# Patient Record
Sex: MALE | Race: WHITE
[De-identification: names, ages, dates, MRNs, and addresses within clinical notes are randomized per-mention and may not be internally consistent; named-entity substitution may affect disease eponyms.]

---
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
## 2021-06-12 NOTE — DISCHARGE NOTE PEDIATRIC - PROVIDER TOKENS
Lm adv adv per hao    FREE:[LAST:[Suppa],FIRST:[[place]],PHONE:[phone]],FAX:[(   )    -],ADDRESS:[[address] [place] [address]]],FREE:[LAST:[Enrrique],FIRST:[Brittany],PHONE:[phone]:[(   )    -],ADDRESS:[[address] [place] [address]]] FREE:[LAST:[Suppa],FIRST:[[place]],PHONE:[phone]],FAX:[(   )    -],ADDRESS:[[address] [place] [address], [place] [id number]  [place] [address]]],FREE:[LAST:[Enrrique],FIRST:[Brittany],PHONE:[phone]:[(   )    -],ADDRESS:[[address] [place] [address], [place] [address]]],FREE:[LAST:[Estrada],FIRST:[Herminia],PHONE:[phone]],FAX:[(   )    -],ADDRESS:[[address] [place] [address]]],TOKEN:'[id number]'

## 2021-11-12 ENCOUNTER — EMERGENCY (EMERGENCY)
Facility: HOSPITAL | Age: 21
LOS: 1 days | Discharge: HOME | End: 2021-11-12
Attending: EMERGENCY MEDICINE
Payer: COMMERCIAL

## 2021-11-12 VITALS
RESPIRATION RATE: 14 BRPM | WEIGHT: 171.08 LBS | DIASTOLIC BLOOD PRESSURE: 80 MMHG | HEART RATE: 130 BPM | TEMPERATURE: 98 F | SYSTOLIC BLOOD PRESSURE: 130 MMHG | OXYGEN SATURATION: 94 %

## 2021-11-12 NOTE — ED ADULT TRIAGE NOTE - CHIEF COMPLAINT QUOTE
Patient BIBEMS after being found unresponsive by friends. Patient's friends state they took Percocet and Xanax. Patient received 4mg Narcan IN prior to arrival and is now awake and alert.

## 2021-11-13 VITALS
TEMPERATURE: 98 F | SYSTOLIC BLOOD PRESSURE: 120 MMHG | RESPIRATION RATE: 16 BRPM | OXYGEN SATURATION: 95 % | DIASTOLIC BLOOD PRESSURE: 70 MMHG | HEART RATE: 99 BPM

## 2021-11-13 NOTE — ED PROVIDER NOTE - OBJECTIVE STATEMENT
21yom found unresponsive after taking percocet required naloxone reversal by EMS. Has no complaints at present. 21yom found unresponsive after taking percocet required naloxone reversal by EMS. Has no complaints at present. States overdose was accidental and declines any need for substance abuse treatment.

## 2021-11-13 NOTE — ED PROVIDER NOTE - CLINICAL SUMMARY MEDICAL DECISION MAKING FREE TEXT BOX
21yom found unresponsive after taking percocet required naloxone reversal by EMS. Has no complaints at present. States overdose was accidental and declines any need for substance abuse treatment. Provide naloxone rescue kit

## 2021-11-13 NOTE — ED PROVIDER NOTE - PATIENT PORTAL LINK FT
You can access the FollowMyHealth Patient Portal offered by [facility] by registering at the following website: [email or web address]. By joining [employer]’s FollowMyHealth portal, you will also be able to view your health information using other applications (apps) compatible with our system.

## 2021-11-19 ENCOUNTER — EMERGENCY (EMERGENCY)
Facility: HOSPITAL | Age: 21
LOS: 0 days | Discharge: HOME | End: 2021-11-19
Attending: EMERGENCY MEDICINE
Payer: COMMERCIAL

## 2021-11-19 VITALS
DIASTOLIC BLOOD PRESSURE: 90 MMHG | OXYGEN SATURATION: 100 % | TEMPERATURE: 99 F | HEART RATE: 62 BPM | SYSTOLIC BLOOD PRESSURE: 141 MMHG | RESPIRATION RATE: 18 BRPM

## 2021-11-19 VITALS — WEIGHT: 160.06 LBS | HEIGHT: 70 IN

## 2021-11-19 DIAGNOSIS — R50.9 FEVER, UNSPECIFIED: ICD-10-CM

## 2021-11-19 DIAGNOSIS — Z86.59 PERSONAL HISTORY OF OTHER MENTAL AND BEHAVIORAL DISORDERS: ICD-10-CM

## 2021-11-19 DIAGNOSIS — R11.2 NAUSEA WITH VOMITING, UNSPECIFIED: ICD-10-CM

## 2021-11-19 DIAGNOSIS — F11.23 OPIOID DEPENDENCE WITH WITHDRAWAL: ICD-10-CM

## 2021-11-19 DIAGNOSIS — R19.7 DIARRHEA, UNSPECIFIED: ICD-10-CM

## 2021-11-19 LAB
ALBUMIN SERPL ELPH-MCNC: 4.7 G/DL — SIGNIFICANT CHANGE UP (ref 3.3–5)
ALP SERPL-CCNC: 83 U/L — SIGNIFICANT CHANGE UP (ref 40–120)
ALT FLD-CCNC: 28 U/L — SIGNIFICANT CHANGE UP (ref 12–78)
ANION GAP SERPL CALC-SCNC: 9 MMOL/L — SIGNIFICANT CHANGE UP (ref 5–17)
APAP SERPL-MCNC: < 2 UG/ML (ref 10–30)
APPEARANCE UR: ABNORMAL
AST SERPL-CCNC: 25 U/L — SIGNIFICANT CHANGE UP (ref 15–37)
BASOPHILS # BLD AUTO: 0.04 K/UL — SIGNIFICANT CHANGE UP (ref 0–0.2)
BASOPHILS NFR BLD AUTO: 0.5 % — SIGNIFICANT CHANGE UP (ref 0–2)
BILIRUB SERPL-MCNC: 0.7 MG/DL — SIGNIFICANT CHANGE UP (ref 0.2–1.2)
BILIRUB UR-MCNC: NEGATIVE — SIGNIFICANT CHANGE UP
BUN SERPL-MCNC: 10 MG/DL — SIGNIFICANT CHANGE UP (ref 7–23)
CALCIUM SERPL-MCNC: 9.8 MG/DL — SIGNIFICANT CHANGE UP (ref 8.5–10.1)
CHLORIDE SERPL-SCNC: 104 MMOL/L — SIGNIFICANT CHANGE UP (ref 96–108)
CO2 SERPL-SCNC: 24 MMOL/L — SIGNIFICANT CHANGE UP (ref 22–31)
COLOR SPEC: YELLOW — SIGNIFICANT CHANGE UP
CREAT SERPL-MCNC: 0.78 MG/DL — SIGNIFICANT CHANGE UP (ref 0.5–1.3)
DIFF PNL FLD: ABNORMAL
EOSINOPHIL # BLD AUTO: 0.02 K/UL — SIGNIFICANT CHANGE UP (ref 0–0.5)
EOSINOPHIL NFR BLD AUTO: 0.2 % — SIGNIFICANT CHANGE UP (ref 0–6)
ETHANOL SERPL-MCNC: <10 MG/DL — SIGNIFICANT CHANGE UP (ref 0–10)
GLUCOSE SERPL-MCNC: 88 MG/DL — SIGNIFICANT CHANGE UP (ref 70–99)
GLUCOSE UR QL: NEGATIVE MG/DL — SIGNIFICANT CHANGE UP
HCT VFR BLD CALC: 49.1 % — SIGNIFICANT CHANGE UP (ref 39–50)
HGB BLD-MCNC: 17.4 G/DL — HIGH (ref 13–17)
IMM GRANULOCYTES NFR BLD AUTO: 0.2 % — SIGNIFICANT CHANGE UP (ref 0–1.5)
KETONES UR-MCNC: ABNORMAL
LEUKOCYTE ESTERASE UR-ACNC: NEGATIVE — SIGNIFICANT CHANGE UP
LYMPHOCYTES # BLD AUTO: 1.42 K/UL — SIGNIFICANT CHANGE UP (ref 1–3.3)
LYMPHOCYTES # BLD AUTO: 17.2 % — SIGNIFICANT CHANGE UP (ref 13–44)
MCHC RBC-ENTMCNC: 32 PG — SIGNIFICANT CHANGE UP (ref 27–34)
MCHC RBC-ENTMCNC: 35.4 GM/DL — SIGNIFICANT CHANGE UP (ref 32–36)
MCV RBC AUTO: 90.4 FL — SIGNIFICANT CHANGE UP (ref 80–100)
MONOCYTES # BLD AUTO: 0.62 K/UL — SIGNIFICANT CHANGE UP (ref 0–0.9)
MONOCYTES NFR BLD AUTO: 7.5 % — SIGNIFICANT CHANGE UP (ref 2–14)
NEUTROPHILS # BLD AUTO: 6.13 K/UL — SIGNIFICANT CHANGE UP (ref 1.8–7.4)
NEUTROPHILS NFR BLD AUTO: 74.4 % — SIGNIFICANT CHANGE UP (ref 43–77)
NITRITE UR-MCNC: NEGATIVE — SIGNIFICANT CHANGE UP
PCP SPEC-MCNC: SIGNIFICANT CHANGE UP
PH UR: 6.5 — SIGNIFICANT CHANGE UP (ref 5–8)
PLATELET # BLD AUTO: 327 K/UL — SIGNIFICANT CHANGE UP (ref 150–400)
POTASSIUM SERPL-MCNC: 4 MMOL/L — SIGNIFICANT CHANGE UP (ref 3.5–5.3)
POTASSIUM SERPL-SCNC: 4 MMOL/L — SIGNIFICANT CHANGE UP (ref 3.5–5.3)
PROT SERPL-MCNC: 8.8 GM/DL — HIGH (ref 6–8.3)
PROT UR-MCNC: 15 MG/DL
RBC # BLD: 5.43 M/UL — SIGNIFICANT CHANGE UP (ref 4.2–5.8)
RBC # FLD: 12.7 % — SIGNIFICANT CHANGE UP (ref 10.3–14.5)
SALICYLATES SERPL-MCNC: <1.7 MG/DL — LOW (ref 2.8–20)
SARS-COV-2 RNA SPEC QL NAA+PROBE: SIGNIFICANT CHANGE UP
SODIUM SERPL-SCNC: 137 MMOL/L — SIGNIFICANT CHANGE UP (ref 135–145)
SP GR SPEC: 1.01 — SIGNIFICANT CHANGE UP (ref 1.01–1.02)
UROBILINOGEN FLD QL: 1 MG/DL
WBC # BLD: 8.25 K/UL — SIGNIFICANT CHANGE UP (ref 3.8–10.5)
WBC # FLD AUTO: 8.25 K/UL — SIGNIFICANT CHANGE UP (ref 3.8–10.5)

## 2021-11-19 PROCEDURE — 93005 ELECTROCARDIOGRAM TRACING: CPT

## 2021-11-19 PROCEDURE — 36415 COLL VENOUS BLD VENIPUNCTURE: CPT

## 2021-11-19 PROCEDURE — 81001 URINALYSIS AUTO W/SCOPE: CPT

## 2021-11-19 PROCEDURE — 80307 DRUG TEST PRSMV CHEM ANLYZR: CPT

## 2021-11-19 PROCEDURE — 85025 COMPLETE CBC W/AUTO DIFF WBC: CPT

## 2021-11-19 PROCEDURE — 96361 HYDRATE IV INFUSION ADD-ON: CPT

## 2021-11-19 PROCEDURE — U0005: CPT

## 2021-11-19 PROCEDURE — U0003: CPT

## 2021-11-19 PROCEDURE — 99285 EMERGENCY DEPT VISIT HI MDM: CPT

## 2021-11-19 PROCEDURE — 99284 EMERGENCY DEPT VISIT MOD MDM: CPT | Mod: 25

## 2021-11-19 PROCEDURE — 80053 COMPREHEN METABOLIC PANEL: CPT

## 2021-11-19 PROCEDURE — 96375 TX/PRO/DX INJ NEW DRUG ADDON: CPT

## 2021-11-19 PROCEDURE — 96374 THER/PROPH/DIAG INJ IV PUSH: CPT

## 2021-11-19 PROCEDURE — 93010 ELECTROCARDIOGRAM REPORT: CPT

## 2021-11-19 RX ORDER — SODIUM CHLORIDE 9 MG/ML
1000 INJECTION INTRAMUSCULAR; INTRAVENOUS; SUBCUTANEOUS ONCE
Refills: 0 | Status: COMPLETED | OUTPATIENT
Start: 2021-11-19 | End: 2021-11-19

## 2021-11-19 RX ORDER — ONDANSETRON 8 MG/1
4 TABLET, FILM COATED ORAL ONCE
Refills: 0 | Status: COMPLETED | OUTPATIENT
Start: 2021-11-19 | End: 2021-11-19

## 2021-11-19 RX ORDER — FAMOTIDINE 10 MG/ML
20 INJECTION INTRAVENOUS ONCE
Refills: 0 | Status: COMPLETED | OUTPATIENT
Start: 2021-11-19 | End: 2021-11-19

## 2021-11-19 RX ADMIN — ONDANSETRON 4 MILLIGRAM(S): 8 TABLET, FILM COATED ORAL at 09:42

## 2021-11-19 RX ADMIN — SODIUM CHLORIDE 1000 MILLILITER(S): 9 INJECTION INTRAMUSCULAR; INTRAVENOUS; SUBCUTANEOUS at 09:39

## 2021-11-19 RX ADMIN — SODIUM CHLORIDE 1000 MILLILITER(S): 9 INJECTION INTRAMUSCULAR; INTRAVENOUS; SUBCUTANEOUS at 11:44

## 2021-11-19 RX ADMIN — SODIUM CHLORIDE 1000 MILLILITER(S): 9 INJECTION INTRAMUSCULAR; INTRAVENOUS; SUBCUTANEOUS at 12:44

## 2021-11-19 RX ADMIN — SODIUM CHLORIDE 1000 MILLILITER(S): 9 INJECTION INTRAMUSCULAR; INTRAVENOUS; SUBCUTANEOUS at 10:40

## 2021-11-19 RX ADMIN — FAMOTIDINE 20 MILLIGRAM(S): 10 INJECTION INTRAVENOUS at 09:39

## 2021-11-19 NOTE — SBIRT NOTE ADULT - NSSBIRTDRGACTIVEREFTXDET_GEN_A_CORE
Provided SBIRT services: Full screen positive. Referral to Treatment Performed. Screening results were reviewed with the patient and patient was provided information about healthy guidelines and potential negative consequences associated with level of risk. Motivation and readiness to reduce or stop use was discussed and goals and activities to make changes were suggested/offered.  Referral for complete assessment and level of care determination at a certified treatment facility was completed by contacting the treatment facility via phone.

## 2021-11-19 NOTE — ED ADULT NURSE REASSESSMENT NOTE - NS ED NURSE REASSESS COMMENT FT1
spoke with patients wife Nancy on phone w/ permission from patient. updated on POC. breakfast ordered for pt. pending bed placement at this time.

## 2021-11-19 NOTE — SBIRT NOTE ADULT - NSSBIRTSCREENAVAIL_GEN_A_CORE
ED RN requested tSBIRT consult; telephonic FULL SCREEN pending./Yes ED RN requested tSBIRT consult; telephonic FULL SCREEN completed; visit record to be faxed to [facility]./Yes ED RN requested tSBIRT consult; telephonic FULL SCREEN completed./Yes

## 2021-11-19 NOTE — ED PROVIDER NOTE - CLINICAL SUMMARY MEDICAL DECISION MAKING FREE TEXT BOX
22 y/o M with a PMHx of substance abuse, asthma, anxiety presents to the ED BIB mother c/o  drug withdrawal symptoms including N/V/D, subjective fever , intermittent abd pain, poor appetite , diaphoresis , chills, auditory hallucinations. (+) mild tremulous. No SI or HI. MIldly dehydrated on exam. Plan: EKG, labs including alcohol level, COVID PCR, IV fluid Pepcid and Zofran, SBIRT consult, monitor, observe, reassess. 22 y/o M with a PMHx of substance abuse, asthma, anxiety presents to the ED BIB mother c/o  drug withdrawal symptoms including N/V/D, subjective fever , intermittent abd pain, poor appetite , diaphoresis , chills, auditory hallucinations. (+) mild tremulous. No SI or HI. MIldly dehydrated on exam. Plan: EKG, labs including alcohol level, COVID PCR, IV fluid, IV Pepcid and Zofran, SBIRT consult, monitor, observe, reassess.

## 2021-11-19 NOTE — ED PROVIDER NOTE - NSFOLLOWUPINSTRUCTIONS_ED_ALL_ED_FT
Follow up this afternoon at [facility] for further management.      Opioid Withdrawal    WHAT YOU NEED TO KNOW:    Withdrawal is a response to a sudden lack of opioids in your body. Withdrawal happens when you suddenly decrease or stop taking an opioid you are dependent on. Dependence means you feel you need the opioid to function mentally or physically. This happens after you have used the opioid regularly for a long time. Withdrawal can happen with an illegal opioid such as heroin, or a prescription opioid such as oxycodone or fentanyl.    DISCHARGE INSTRUCTIONS:    Call your local emergency number (911 in the [place]), or have someone else call if:   •You have a seizure.      •You cannot be woken.      Call your doctor if:   •You have a fast heartbeat.      •You have nausea and are vomiting, or you cannot stop vomiting.      •You have the following signs and symptoms of dehydration: ?Dry eyes or mouth      ?Increased thirst      ?Dark yellow urine, or urinating little or not at all      ?Headache, dizziness, or confusion      ?Irregular or fast breathing, fast or pounding heartbeat      •You have questions or concerns about your condition or care.      Medicines: You may need any of the following:   •NSAIDs, such as ibuprofen, help decrease swelling, pain, and fever. This medicine is available with or without a doctor's order. NSAIDs can cause stomach bleeding or kidney problems in certain people. If you take blood thinner medicine, always ask your healthcare provider if NSAIDs are safe for you. Always read the medicine label and follow directions.      •Blood pressure medicine decreases symptoms of withdrawal, such as nausea, vomiting, muscle tension, and anxiety.       •Antianxiety medicine decreases anxiety and helps you feel calm and relaxed.      •Antinausea medicine helps calm your stomach and prevent vomiting.       •Take your medicine as directed. Contact your healthcare provider if you think your medicine is not helping or if you have side effects. Tell him of her if you are allergic to any medicine. Keep a list of the medicines, vitamins, and herbs you take. Include the amounts, and when and why you take them. Bring the list or the pill bottles to follow-up visits. Carry your medicine list with you in case of an emergency.      Prevent withdrawal from a prescription opioid: The best way to prevent withdrawal is to prevent tolerance. You may need to take a different kind of pain medicine after a surgery or injury. You can also talk to your healthcare provider about ways to manage pain without medicine. If you do need to take an opioid medicine, the following can help prevent withdrawal:   •Do not suddenly stop using the opioid. If you have been using the opioid for longer than 2 weeks, a sudden stop may cause dangerous side effects. Work with your healthcare provider to decrease your dose slowly.      •Take a prescribed opioid exactly as directed. Do not take more than the recommended amount. Do not take it more often or for longer than recommended. If you use a pain patch, be sure to remove the old patch before you place a new one. Talk to your doctor or a pharmacist if you have any questions about your medicine. Opioids often come with a Medication Guide to help you use it safely. Ask your pharmacists for a copy if you do not get one when you fill the prescription.      •Talk to your provider about signs or symptoms of a problem. Tell your provider if you think you are developing opioid tolerance or dependence. Work with your provider to stop or lower the amount safely.      Opioid safety:   •Do not take opioids that belong to someone else. The kind or amount that person takes may not be right for you.      •Do not mix opioids with other medicines or alcohol. The combination can cause an overdose, or cause you to stop breathing. Alcohol, sleeping pills, and medicines such as antihistamines can make you sleepy. A combination with opioids can lead to a coma.      •Learn about the signs of an overdose so you know how to respond. Tell others about these signs so they will know what to do if needed. Talk to your healthcare provider about naloxone. You may be able to keep naloxone at home in case of an overdose. Your family and friends can also be trained on how to give it to you if needed.      •Keep opioids out of the reach of children. Store opioids in a locked cabinet or in a location that children cannot get to.      •Follow instructions for what to do with prescription opioids you do not use. Your healthcare provider will give you instructions for how to dispose of it safely. This helps make sure no one else takes it.      Follow up with your healthcare provider as directed: You may need to return for other tests. You may also be referred to a specialty clinic to receive maintenance therapy medicine on a regular basis. Write down your questions so you remember to ask them during your visits.

## 2021-11-19 NOTE — ED PROVIDER NOTE - PROGRESS NOTE DETAILS
JENNIFER Morrow MD:  Pt symptomatically improved s/p IVF & IV Pepcid/Zofran, + tolerating po fluids well.  Pt accepted at [facility] for inpt management.

## 2021-11-19 NOTE — ED ADULT NURSE NOTE - OBJECTIVE STATEMENT
pt arrived via mother to ED. pt admitted for withdrawal symptoms. pt states he is a " drug addict" and has been abusing multiple forms of opioids for years. pt states that he was hospitalized two weeks ago at [facility] for overdose where he was Narcan 3 times. pt also states that he has tried to cut down on amount he takes, has been to rehab three times in the past and was sober for one year. 0630 AM is the last time the patient has taken medication and states it is a form of " heroin". pt only drinks alcohol socially. pt appears to be shivering on and off and sweating. pt denies pain. pt states he feels nauseous and his stomach is bothering him. skin color appropriate for race. pt is normal sinus rhythm on telemetry monitor. HR in the 60s, /90. mother at bedside. pt denies SI. when asked if pt feels down depressed, hopeless, pt states " yes". pt arrived via mother to ED. pt admitted for withdrawal symptoms. pt states he is a " drug addict" and has been abusing multiple forms of opioids for years. pt states that he was hospitalized two weeks ago at [facility] for overdose where he was Narcan 3 times. pt also states that he has tried to cut down on amount he takes, has been to rehab three times in the past and was sober for one year. 0630 AM is the last time the patient has taken medication and states it is a form of " heroin". pt only drinks alcohol socially. pt appears to be shivering on and off and sweating. pt denies pain. pt states he feels nauseous and his stomach is bothering him. pt states he " cannot keep down water" and either has "diarrhea or vomiting".  skin color appropriate for race. pt is normal sinus rhythm on telemetry monitor. HR in the 60s, /90. mother at bedside. pt denies SI. when asked if pt feels down depressed, hopeless, pt states " yes". pt arrived via mother to ED. pt admitted for withdrawal symptoms. pt states he is a " drug addict" and has been abusing multiple forms of opioids for years. pt states that he was hospitalized two weeks ago at [facility] for overdose where he was Narcan 3 times. pt also states that he has tried to cut down on amount he takes, has been to rehab three times in the past and was sober for one year. 0630 AM is the last time the patient has taken unprescribed medication. pt only drinks alcohol socially. pt appears to be shivering on and off and sweating. pt denies pain. pt states he feels nauseous and his stomach is bothering him. pt states he " cannot keep down water" and either has "diarrhea or vomiting".  skin color appropriate for race. pt is normal sinus rhythm on telemetry monitor. HR in the 60s, /90. mother at bedside. pt denies SI. when asked if pt feels down depressed, hopeless, pt states " yes". pt arrived via mother to ED. pt arrived with withdrawal symptoms. pt states he is a " drug addict" and has been abusing multiple forms of opioids for years. pt states that he was hospitalized two weeks ago at [facility] for overdose where he was Narcan 3 times. pt also states that he has tried to cut down on amount he takes, has been to rehab three times in the past and was sober for one year. 0630 AM is the last time the patient has taken unprescribed medication. pt only drinks alcohol socially. pt appears to be shivering on and off and sweating. pt denies pain. pt states he feels nauseous and his stomach is bothering him. pt states he " cannot keep down water" and either has "diarrhea or vomiting".  skin color appropriate for race. pt is normal sinus rhythm on telemetry monitor. HR in the 60s, /90. mother at bedside. pt denies SI. when asked if pt feels down depressed, hopeless, pt states " yes".

## 2021-11-19 NOTE — ED PROVIDER NOTE - PATIENT PORTAL LINK FT
You can access the FollowMyHealth Patient Portal offered by [facility] by registering at the following website: [email or web address]. By joining [employer]’s FollowMyHealth portal, you will also be able to view your health information using other applications (apps) compatible with our system.

## 2021-11-19 NOTE — ED ADULT TRIAGE NOTE - CHIEF COMPLAINT QUOTE
Patient comes to ED for withdrawal. patient reports using fentanyl, heroin, percocet and xanax. patient reports using " car fentanyl" then started using percocet and fentanyl. patient reports last dose 645 this morning but 1/4 dose. patient denies SI or HI. reports nausea and vomiting. patient reports has been trying to wean off since overdose 2 weeks ago.

## 2021-11-19 NOTE — SBIRT NOTE ADULT - NSSBIRTDRGPASSREFTXDET_GEN_A_CORE
Provided SBIRT services: Full screen positive. Referral to Treatment Performed. Screening results were reviewed with the patient and patient was provided information about healthy guidelines and potential negative consequences associated with level of risk. Motivation and readiness to reduce or stop use was discussed and goals and activities to make changes were suggested/offered.  Referral for complete assessment and level of care determination at a certified treatment facility was completed by contacting the treatment facility via phone.  Pending medical clearance.

## 2021-11-19 NOTE — SBIRT NOTE ADULT - NSSBIRTSAVECONSULT_GEN_A_CORE
Active Offered information, patient signed consent for: [employer], case management service./Complete Offered information, patient signed consent for: [employer], case management service:[phone]./Complete

## 2021-11-19 NOTE — ED PROVIDER NOTE - OBJECTIVE STATEMENT
22 y/o M with a PMHx of substance abuse, asthma, anxiety presents to the ED BIB mother c/o  drug withdrawal symptoms including N/V/D, subjective fever , intermittent abd pain, poor appetite , diaphoresis , chills, auditory hallucinations. Pt reports using  Fentanyl, Heroin, Percocet, and Xanax.  Last dose 645 this AM was a 1/4 dose of Percocet. No SI or HI. Pt has been trying to wean off since overdose 2 weeks ago; states he overdosed 2 weeks ago on Fentanyl and Percocet; was evaluated at [facility] afterwards and given Narcan twice. Pt reports addiction to Fentanyl and has used "car fentanyl" which he states is a  fentanyl 100 times stronger than regular fentanyl.  Reports that since his overdose he has been taking Heroin orally as well as other drugs including: Xanax, Promethazine, Heroin , Car Fentanyl, Fentanyl , Marijuana, ETOH; Last ETOH consumption 9/8/21. Pt states that since his overdose he has been trying to wean himself off drugs and detox; Endorses drug withdrawal symptoms including chills; auditory hallucinations (pt states he also takes Nitrous Oxide); N/V ; Anorexia and poor appetite; intermittent abd pain ; diarrhea with the last episode being 2-3 days ago ; subjective fever ; sweating profusely. States he has had difficulty keeping water dose for the past 1-2 days which prompted ED visit today. No OP PMD. 22 y/o M with a PMHx of substance abuse, asthma, anxiety presents to the ED BIB mother c/o  drug withdrawal symptoms including N/V/D, subjective fever , intermittent abd pain, poor appetite , diaphoresis , chills, auditory hallucinations. Pt reports using  Fentanyl, Heroin, Percocet, and Xanax.  Last dose 645 this AM was a 1/4 dose of Percocet. No SI or HI. Pt has been trying to wean off since overdose 2 weeks ago; states he overdosed 2 weeks ago on Fentanyl and Percocet; was evaluated at [place] afterwards and given Narcan twice. Pt reports addiction to Fentanyl and has used "car fentanyl" which he states is a  fentanyl 100 times stronger than regular fentanyl.  Reports that since his overdose he has been taking Heroin orally as well as other drugs including: Xanax, Promethazine, Heroin , Car Fentanyl, Fentanyl , Marijuana, ETOH; Last ETOH consumption 9/8/21. Pt states that since his overdose he has been trying to wean himself off drugs and detox; Endorses drug withdrawal symptoms including chills; auditory hallucinations: "occ. "whoosing noises" (pt attributes to his abusing of Nitrous Oxide); N/V ; Anorexia and poor appetite; intermittent abd pain ; diarrhea with the last episode being 2-3 days ago ; subjective fever ; sweating profusely. States he has had difficulty keeping down water for the past 1-2 days which prompted ED visit today. No OP PMD.

## 2022-06-08 ENCOUNTER — NON-APPOINTMENT (OUTPATIENT)
Age: 22
End: 2022-06-08

## 2022-06-10 ENCOUNTER — NON-APPOINTMENT (OUTPATIENT)
Age: 22
End: 2022-06-10

## 2022-06-29 ENCOUNTER — NON-APPOINTMENT (OUTPATIENT)
Age: 22
End: 2022-06-29

## 2022-08-03 ENCOUNTER — NON-APPOINTMENT (OUTPATIENT)
Age: 22
End: 2022-08-03

## 2022-08-10 ENCOUNTER — NON-APPOINTMENT (OUTPATIENT)
Age: 22
End: 2022-08-10

## 2023-01-17 NOTE — ED PROVIDER NOTE - SKIN NEGATIVE STATEMENT, MLM
Addended by: Neda Blanchard on: 1/17/2023 11:29 AM     Modules accepted: Orders Progress Note     Date: 6/17/2022  Anaid Hanley  [id number]  1950    Chief Complaint   Patient presents with   â¢ Sore Throat       Problem List :      S: Anaid Hanley is a 80year old male who was admitted on 6/14/2022 for pharyngitis. Oral secretions improving more. Sore throat is improving. Review of Systems:   As above, all others are negative. Vitals:    06/17/22 1150   BP: (!) 159/77   Pulse: (!) 121   Resp: 16   Temp: 98.4 Â°F (36.9 Â°C)       Physical Exam   CV - S1/S2, no S3/S4  Pulmonary-upper airway rales  Abdomen - Soft, nontender and nondistended. Bowel sounds present. No hepatosplenomegaly. Musculoskeletal- ROM intact, strength 5/5 all 4 extremities. Skin- No rashes or lesions. Neurologic -  CNs II-XII are intact. Sensation intact all 4 extremities and face. Psychiatric- A&Ox3. Mood and affect normal.    Labs   Recent Labs   Lab 06/17/22  0530 06/15/22  0539 06/14/22  1002   SODIUM 139   < > 134*   POTASSIUM 4.3   < > 4.0   CHLORIDE 104   < > 97   CO2 24   < > 21   BUN 20   < > 13   CREATININE 0.95   < > 1.12   GLUCOSE 249*   < > 190*   ALBUMIN 2.8*   < > 3.8   AST 19   < > 29   BILIRUBIN 0.2   < > 0.3   CPK  --   --  103    < > = values in this interval not displayed. Recent Labs   Lab 06/17/22  0530   WBC 5.9   HGB 11.7*   HCT 33.8*      MCV 82.8         Imaging   MRI brain pending       Assessment and [place] is an 80year old male who was admitted on 6/14/2022 for COVID-19/pharyngitis.  Plan is as follows:    Pharyngitis  COVID-positive  Possible UTI  Sepsis, likely viral  -Appreciate ID recommendations  -Patient will receive remdesivir  -Started Decadron given mild hypoxia  -Continue Rocephin  -Continue Acapella to help clear secretions  -Continue scopolamine patch as well as sublingual atropine--change atropine drops to prn tmrw  Â   Hypomagnesemia  -Replete  Â   Abnormal CT head  -Unable to rule out possible hemorrhagic lesion. Neurology has been consulted  -Recommending MRI of the brain. 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 Â   Questionable urinary retention  BPH  Resume home meds  Â   Hypertension  -Resume home meds  Â   Diabetes mellitus type 2  -Hold home metformin.   Insulin sliding scale        Code Status:Full Resuscitation    Dickson Cuenca MD  6/17/2022 no abrasions, no jaundice, no lesions, no pruritis, and no rashes.

## 2023-03-14 NOTE — DISCHARGE NOTE PEDIATRIC - PROVIDER RX CONTACT NUMBER
Harry Alvarado is a 67 y.o. male who presents with the following chronic medical problems including MTHFR gene mutation, coronary artery disease with prior myocardial infarction 1993, peripheral artery disease with multiple bypass procedures in the past most recent with tPA bolus and angioplasty in 2020.  The patient admitted to [facility] on November 30, 2022 to the vascular surgery service for planned BKA due to bypass thrombosis.  At the time of admission the patient had cyanosis and was beginning to develop thanh gangrene of the foot.  Patient has a history of 15+ surgeries of the extremities and understood that his revascularization options were limited.  He had severe 10 out of 10 pain of the foot relieved with elevation of the leg.  He was still able to ambulate but limited by pain.  Under the care of Bowen Serrato MD of vascular surgery he underwent right transtibial amputation on December 2, 2022.  Postoperative course unremarkable.  For postoperative pain control he was treated with morphine.  On discharge prescribed Tylenol and low-dose Lyrica.  Lyrica started prior by his PCP for neuropathic pain.  He was restarted on his home Eliquis.  He required min assist for bed mobility, transfers and ambulation.  The patient was admitted to [facility] on December 6, 2022 for acute inpatient rehabilitation for preprosthetic training.  The patient was discharged on December 18, 2022 after  completing a comprehensive inpatient rehabilitation program progressing to a modified independent level with bed mobility, transfers and standing with rolling walker with limited hopping.  Pain control stable with medication change to hydrocodone/acetaminophen.  Continue Lyrica increased dose 50 mg 3 times a day.  The patient was also placed on nortriptyline for sleep and neuropathic pain.   The patient has follow-up outpatient with vascular surgery with improvement in wound healing.  The patient  was evaluated by Dr. Zambrano in amputee clinic and felt appropriate for acute inpatient rehabilitation for intensive prosthetic training prior to returning home where he lives in [place].  The patient's pain has improved and he is weaned off all of his medications except for his Eliquis.    Comprehensive inpatient rehabilitation program for functional deficit status post right transtibial amputation.  Goal is for modified and pedal level with ambulation and self-care activities with prosthesis.  Monitor for pain.  Monitor skin closely.  Monitor cardiovascular status.    The patient touch assist to min assist with transfers and ambulation with rolling walker with prosthesis.  Continues to progress well and improved with therapies.  No new problems.  Pain controlled without medication.   [phone]

## 2023-03-21 ENCOUNTER — NON-APPOINTMENT (OUTPATIENT)
Age: 23
End: 2023-03-21

## 2023-08-12 ENCOUNTER — EMERGENCY (EMERGENCY)
Facility: HOSPITAL | Age: 23
LOS: 0 days | Discharge: HOME | End: 2023-08-13
Attending: EMERGENCY MEDICINE
Payer: COMMERCIAL

## 2023-08-12 VITALS — WEIGHT: 240.08 LBS | HEIGHT: 71 IN

## 2023-08-12 DIAGNOSIS — R51.9 HEADACHE, UNSPECIFIED: ICD-10-CM
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
## 2023-08-12 DIAGNOSIS — Z86.59 PERSONAL HISTORY OF OTHER MENTAL AND BEHAVIORAL DISORDERS: ICD-10-CM

## 2023-08-12 DIAGNOSIS — J45.909 UNSPECIFIED ASTHMA, UNCOMPLICATED: ICD-10-CM

## 2023-08-12 DIAGNOSIS — S06.0X9A CONCUSSION WITH LOSS OF CONSCIOUSNESS OF UNSPECIFIED DURATION, INITIAL ENCOUNTER: ICD-10-CM
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
## 2023-08-12 DIAGNOSIS — F41.9 ANXIETY DISORDER, UNSPECIFIED: ICD-10-CM

## 2023-08-12 DIAGNOSIS — R11.0 NAUSEA: ICD-10-CM

## 2023-08-12 PROCEDURE — 99053 MED SERV 10PM-8AM 24 HR FAC: CPT

## 2023-08-12 PROCEDURE — 99285 EMERGENCY DEPT VISIT HI MDM: CPT

## 2023-08-12 PROCEDURE — 70450 CT HEAD/BRAIN W/O DYE: CPT | Mod: MA

## 2023-08-12 PROCEDURE — 72125 CT NECK SPINE W/O DYE: CPT | Mod: MA

## 2023-08-12 PROCEDURE — 99284 EMERGENCY DEPT VISIT MOD MDM: CPT | Mod: 25

## 2023-08-12 PROCEDURE — 76376 3D RENDER W/INTRP POSTPROCES: CPT

## 2023-08-12 PROCEDURE — 73090 X-RAY EXAM OF FOREARM: CPT | Mod: RT

## 2023-08-12 PROCEDURE — 73130 X-RAY EXAM OF HAND: CPT | Mod: LT

## 2023-08-12 PROCEDURE — 70486 CT MAXILLOFACIAL W/O DYE: CPT | Mod: MA
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
## 2023-08-13 PROBLEM — J45.909 UNSPECIFIED ASTHMA, UNCOMPLICATED: Chronic | Status: ACTIVE | Noted: 2021-11-20

## 2023-08-13 PROCEDURE — 73090 X-RAY EXAM OF FOREARM: CPT | Mod: 26,RT

## 2023-08-13 PROCEDURE — 70486 CT MAXILLOFACIAL W/O DYE: CPT | Mod: 26,MA

## 2023-08-13 PROCEDURE — 70450 CT HEAD/BRAIN W/O DYE: CPT | Mod: 26,MA

## 2023-08-13 PROCEDURE — 73130 X-RAY EXAM OF HAND: CPT | Mod: 26,LT

## 2023-08-13 PROCEDURE — 76376 3D RENDER W/INTRP POSTPROCES: CPT | Mod: 26

## 2023-08-13 PROCEDURE — 72125 CT NECK SPINE W/O DYE: CPT | Mod: 26,MA
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
## 2023-08-13 NOTE — ED PROVIDER NOTE - PATIENT PORTAL LINK FT
You can access the FollowMyHealth Patient Portal offered by [facility] by registering at the following website: [email or web address]. By joining [employer]’s FollowMyHealth portal, you will also be able to view your health information using other applications (apps) compatible with our system.

## 2023-08-13 NOTE — ED ADULT NURSE NOTE - NSFALLUNIVINTERV_ED_ALL_ED
Bed/Stretcher in lowest position, wheels locked, appropriate side rails in place/Call bell, personal items and telephone in reach/Instruct patient to call for assistance before getting out of bed/chair/stretcher/Non-slip footwear applied when patient is off stretcher/[place] to call system/Physically safe environment - no spills, clutter or unnecessary equipment/Purposeful proactive rounding/Room/bathroom lighting operational, light cord in reach
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
## 2023-08-13 NOTE — ED PROVIDER NOTE - MUSCULOSKELETAL, MLM
Spine appears normal, range of motion is not limited,  swelling and tenderness to palpation at the dorsal/lateral aspect of the left hand with full range of motion of the hand and wrist.  Swelling of the dorsal aspect of the distal right forearm with full range of motion

## 2023-08-13 NOTE — ED PROVIDER NOTE - ENMT, MLM
Airway patent, Nasal mucosa clear. Mouth with normal mucosa. Throat has no vesicles, no oropharyngeal exudates and uvula is midline.   Swelling and mild tenderness on nasal bridge.  Extraocular motion is intact.  Pupils equal round and reactive to light.  No tenderness to palpation of the bilateral periorbital region
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
## 2023-08-13 NOTE — ED ADULT NURSE NOTE - NSICDXPASTMEDICALHX_GEN_ALL_CORE_FT
----- Message from Srinath Benton MD sent at 1/13/2023 12:55 PM EST -----  Please call patient  MRI was normal  Will discuss more fully at next visit  PAST MEDICAL HISTORY:  Anxiety     Asthma     Substance abuse

## 2023-11-15 NOTE — CONSULT NOTE PEDS - CONSULT REASON
restricting, wt loss [facility]  Family Medicine  [address] [place] [address]  Phone: [phone]  Fax:

## 2024-01-09 NOTE — PROGRESS NOTE PEDS - PROBLEM SELECTOR PLAN 4
- Positive Utox for MJ on admission  - Discussed with parent and Mateo, MJ can be contributing factor for all symptoms (anxiety, nausea, abdominal discomfort). skin intact